# Patient Record
Sex: FEMALE | Race: WHITE | URBAN - METROPOLITAN AREA
[De-identification: names, ages, dates, MRNs, and addresses within clinical notes are randomized per-mention and may not be internally consistent; named-entity substitution may affect disease eponyms.]

---

## 2018-05-30 ENCOUNTER — OFFICE VISIT (OUTPATIENT)
Dept: OBGYN CLINIC | Facility: CLINIC | Age: 23
End: 2018-05-30

## 2018-05-30 VITALS
HEIGHT: 65 IN | BODY MASS INDEX: 19.33 KG/M2 | HEART RATE: 84 BPM | SYSTOLIC BLOOD PRESSURE: 110 MMHG | WEIGHT: 116 LBS | RESPIRATION RATE: 16 BRPM | DIASTOLIC BLOOD PRESSURE: 60 MMHG

## 2018-05-30 DIAGNOSIS — R10.2 PELVIC PAIN: ICD-10-CM

## 2018-05-30 DIAGNOSIS — N76.0 VAGINITIS AND VULVOVAGINITIS: Primary | ICD-10-CM

## 2018-05-30 PROCEDURE — 87491 CHLMYD TRACH DNA AMP PROBE: CPT | Performed by: OBSTETRICS & GYNECOLOGY

## 2018-05-30 PROCEDURE — 87660 TRICHOMONAS VAGIN DIR PROBE: CPT | Performed by: OBSTETRICS & GYNECOLOGY

## 2018-05-30 PROCEDURE — 87510 GARDNER VAG DNA DIR PROBE: CPT | Performed by: OBSTETRICS & GYNECOLOGY

## 2018-05-30 PROCEDURE — 87480 CANDIDA DNA DIR PROBE: CPT | Performed by: OBSTETRICS & GYNECOLOGY

## 2018-05-30 PROCEDURE — 87591 N.GONORRHOEAE DNA AMP PROB: CPT | Performed by: OBSTETRICS & GYNECOLOGY

## 2018-05-30 PROCEDURE — 99203 OFFICE O/P NEW LOW 30 MIN: CPT | Performed by: OBSTETRICS & GYNECOLOGY

## 2018-05-30 RX ORDER — LEVONORGESTREL AND ETHINYL ESTRADIOL 0.1-0.02MG
1 KIT ORAL DAILY
Qty: 1 PACKAGE | Refills: 11 | Status: SHIPPED | OUTPATIENT
Start: 2018-05-30

## 2018-05-30 RX ORDER — LEVONORGESTREL AND ETHINYL ESTRADIOL 0.1-0.02MG
1 KIT ORAL DAILY
COMMUNITY

## 2018-05-30 NOTE — PROGRESS NOTES
Nuvia Shown is a 23year old female  Patient's last menstrual period was 2018 (approximate).  Patient presents with:  Wellness Visit: new pt annual.  .  Patient c/o vaginal itching on and off and sharp pelvic pains, took OCP for 2 months i itching  Musculoskeletal:  denies back pain. Skin/Breast:  Denies any breast pain, lumps, or discharge. Neurological:  denies headaches, extremity weakness or numbness. Psychiatric: denies depression or anxiety.   Endocrine:   denies excessive thirst or

## 2023-04-25 ENCOUNTER — HOSPITAL ENCOUNTER (EMERGENCY)
Facility: HOSPITAL | Age: 28
Discharge: HOME | End: 2023-04-25
Attending: EMERGENCY MEDICINE
Payer: COMMERCIAL

## 2023-04-25 VITALS
TEMPERATURE: 99 F | RESPIRATION RATE: 14 BRPM | OXYGEN SATURATION: 99 % | BODY MASS INDEX: 21 KG/M2 | WEIGHT: 125.69 LBS | SYSTOLIC BLOOD PRESSURE: 116 MMHG | DIASTOLIC BLOOD PRESSURE: 76 MMHG | HEART RATE: 72 BPM

## 2023-04-25 DIAGNOSIS — O46.90 VAGINAL BLEEDING IN PREGNANCY: Primary | ICD-10-CM

## 2023-04-25 LAB
ALBUMIN SERPL-MCNC: 3.7 G/DL (ref 3.4–5)
ALBUMIN/GLOB SERPL: 1.1 {RATIO} (ref 1–2)
ALP LIVER SERPL-CCNC: 49 U/L
ALT SERPL-CCNC: 18 U/L
ANION GAP SERPL CALC-SCNC: 4 MMOL/L (ref 0–18)
AST SERPL-CCNC: 14 U/L (ref 15–37)
B-HCG SERPL-ACNC: 9 MIU/ML
BASOPHILS # BLD AUTO: 0.02 X10(3) UL (ref 0–0.2)
BASOPHILS NFR BLD AUTO: 0.2 %
BILIRUB SERPL-MCNC: 0.3 MG/DL (ref 0.1–2)
BUN BLD-MCNC: 10 MG/DL (ref 7–18)
CALCIUM BLD-MCNC: 8.8 MG/DL (ref 8.5–10.1)
CHLORIDE SERPL-SCNC: 110 MMOL/L (ref 98–112)
CO2 SERPL-SCNC: 26 MMOL/L (ref 21–32)
CREAT BLD-MCNC: 0.67 MG/DL
EOSINOPHIL # BLD AUTO: 0.16 X10(3) UL (ref 0–0.7)
EOSINOPHIL NFR BLD AUTO: 1.8 %
ERYTHROCYTE [DISTWIDTH] IN BLOOD BY AUTOMATED COUNT: 13.2 %
GFR SERPLBLD BASED ON 1.73 SQ M-ARVRAT: 121 ML/MIN/1.73M2 (ref 60–?)
GLOBULIN PLAS-MCNC: 3.4 G/DL (ref 2.8–4.4)
GLUCOSE BLD-MCNC: 102 MG/DL (ref 70–99)
HCT VFR BLD AUTO: 41.9 %
HGB BLD-MCNC: 13.8 G/DL
IMM GRANULOCYTES # BLD AUTO: 0.04 X10(3) UL (ref 0–1)
IMM GRANULOCYTES NFR BLD: 0.5 %
LYMPHOCYTES # BLD AUTO: 2.07 X10(3) UL (ref 1–4)
LYMPHOCYTES NFR BLD AUTO: 23.6 %
MCH RBC QN AUTO: 30.7 PG (ref 26–34)
MCHC RBC AUTO-ENTMCNC: 32.9 G/DL (ref 31–37)
MCV RBC AUTO: 93.3 FL
MONOCYTES # BLD AUTO: 0.58 X10(3) UL (ref 0.1–1)
MONOCYTES NFR BLD AUTO: 6.6 %
NEUTROPHILS # BLD AUTO: 5.9 X10 (3) UL (ref 1.5–7.7)
NEUTROPHILS # BLD AUTO: 5.9 X10(3) UL (ref 1.5–7.7)
NEUTROPHILS NFR BLD AUTO: 67.3 %
OSMOLALITY SERPL CALC.SUM OF ELEC: 289 MOSM/KG (ref 275–295)
PLATELET # BLD AUTO: 242 10(3)UL (ref 150–450)
POTASSIUM SERPL-SCNC: 3.7 MMOL/L (ref 3.5–5.1)
PROT SERPL-MCNC: 7.1 G/DL (ref 6.4–8.2)
RBC # BLD AUTO: 4.49 X10(6)UL
RH BLOOD TYPE: POSITIVE
SODIUM SERPL-SCNC: 140 MMOL/L (ref 136–145)
WBC # BLD AUTO: 8.8 X10(3) UL (ref 4–11)

## 2023-04-25 PROCEDURE — 86901 BLOOD TYPING SEROLOGIC RH(D): CPT | Performed by: EMERGENCY MEDICINE

## 2023-04-25 PROCEDURE — 85025 COMPLETE CBC W/AUTO DIFF WBC: CPT | Performed by: EMERGENCY MEDICINE

## 2023-04-25 PROCEDURE — 84702 CHORIONIC GONADOTROPIN TEST: CPT

## 2023-04-25 PROCEDURE — 99283 EMERGENCY DEPT VISIT LOW MDM: CPT

## 2023-04-25 PROCEDURE — 85025 COMPLETE CBC W/AUTO DIFF WBC: CPT

## 2023-04-25 PROCEDURE — 80053 COMPREHEN METABOLIC PANEL: CPT

## 2023-04-25 PROCEDURE — 86900 BLOOD TYPING SEROLOGIC ABO: CPT

## 2023-04-25 PROCEDURE — 86901 BLOOD TYPING SEROLOGIC RH(D): CPT

## 2023-04-25 PROCEDURE — 86900 BLOOD TYPING SEROLOGIC ABO: CPT | Performed by: EMERGENCY MEDICINE

## 2023-04-25 PROCEDURE — 80053 COMPREHEN METABOLIC PANEL: CPT | Performed by: EMERGENCY MEDICINE

## 2023-04-25 PROCEDURE — 36415 COLL VENOUS BLD VENIPUNCTURE: CPT

## 2023-04-25 PROCEDURE — 84702 CHORIONIC GONADOTROPIN TEST: CPT | Performed by: EMERGENCY MEDICINE

## 2023-04-25 PROCEDURE — 99284 EMERGENCY DEPT VISIT MOD MDM: CPT

## 2023-04-25 NOTE — DISCHARGE INSTRUCTIONS
Your quantitative beta-hCG is only 9    Go to outpatient lab in 2 days to have repeat hormone level drawn    Call OB office today to follow-up on Friday    May take Tylenol for pain